# Patient Record
Sex: FEMALE | Race: WHITE | NOT HISPANIC OR LATINO | URBAN - METROPOLITAN AREA
[De-identification: names, ages, dates, MRNs, and addresses within clinical notes are randomized per-mention and may not be internally consistent; named-entity substitution may affect disease eponyms.]

---

## 2019-05-09 ENCOUNTER — APPOINTMENT (RX ONLY)
Dept: URBAN - METROPOLITAN AREA CLINIC 148 | Facility: CLINIC | Age: 59
Setting detail: DERMATOLOGY
End: 2019-05-09

## 2019-05-09 DIAGNOSIS — L81.4 OTHER MELANIN HYPERPIGMENTATION: ICD-10-CM

## 2019-05-09 DIAGNOSIS — D18.0 HEMANGIOMA: ICD-10-CM

## 2019-05-09 DIAGNOSIS — L82.1 OTHER SEBORRHEIC KERATOSIS: ICD-10-CM

## 2019-05-09 DIAGNOSIS — L72.8 OTHER FOLLICULAR CYSTS OF THE SKIN AND SUBCUTANEOUS TISSUE: ICD-10-CM

## 2019-05-09 DIAGNOSIS — D22 MELANOCYTIC NEVI: ICD-10-CM

## 2019-05-09 PROBLEM — D18.01 HEMANGIOMA OF SKIN AND SUBCUTANEOUS TISSUE: Status: ACTIVE | Noted: 2019-05-09

## 2019-05-09 PROBLEM — L20.84 INTRINSIC (ALLERGIC) ECZEMA: Status: ACTIVE | Noted: 2019-05-09

## 2019-05-09 PROBLEM — D22.5 MELANOCYTIC NEVI OF TRUNK: Status: ACTIVE | Noted: 2019-05-09

## 2019-05-09 PROCEDURE — ? COUNSELING

## 2019-05-09 PROCEDURE — ? INVENTORY

## 2019-05-09 PROCEDURE — ? ADDITIONAL NOTES

## 2019-05-09 PROCEDURE — 99203 OFFICE O/P NEW LOW 30 MIN: CPT

## 2019-05-09 PROCEDURE — ? DEFER

## 2019-05-09 ASSESSMENT — LOCATION DETAILED DESCRIPTION DERM
LOCATION DETAILED: INFERIOR THORACIC SPINE
LOCATION DETAILED: MIDDLE STERNUM
LOCATION DETAILED: RIGHT PROXIMAL DORSAL FOREARM
LOCATION DETAILED: RIGHT SUPERIOR UPPER BACK
LOCATION DETAILED: LEFT PROXIMAL DORSAL FOREARM
LOCATION DETAILED: RIGHT MID-UPPER BACK
LOCATION DETAILED: RIGHT ANTERIOR PROXIMAL UPPER ARM
LOCATION DETAILED: EPIGASTRIC SKIN
LOCATION DETAILED: LEFT ANTERIOR DISTAL THIGH
LOCATION DETAILED: LEFT ANTERIOR PROXIMAL UPPER ARM
LOCATION DETAILED: UPPER STERNUM
LOCATION DETAILED: RIGHT ANTERIOR DISTAL THIGH

## 2019-05-09 ASSESSMENT — LOCATION ZONE DERM
LOCATION ZONE: ARM
LOCATION ZONE: LEG
LOCATION ZONE: TRUNK

## 2019-05-09 ASSESSMENT — LOCATION SIMPLE DESCRIPTION DERM
LOCATION SIMPLE: LEFT THIGH
LOCATION SIMPLE: RIGHT THIGH
LOCATION SIMPLE: LEFT UPPER ARM
LOCATION SIMPLE: ABDOMEN
LOCATION SIMPLE: CHEST
LOCATION SIMPLE: RIGHT UPPER ARM
LOCATION SIMPLE: LEFT FOREARM
LOCATION SIMPLE: RIGHT FOREARM
LOCATION SIMPLE: UPPER BACK
LOCATION SIMPLE: RIGHT UPPER BACK

## 2019-05-09 NOTE — PROCEDURE: ADDITIONAL NOTES
Additional Notes: Patient aware of scheduling with Dr. Villar we will call when availability comes up.
Detail Level: Simple

## 2019-05-09 NOTE — PROCEDURE: DEFER
Scheduling Instructions (Optional): Excision with Dr. Villar
Detail Level: Zone
Introduction Text (Please End With A Colon): The following procedure was deferred:

## 2019-05-16 ENCOUNTER — APPOINTMENT (RX ONLY)
Dept: URBAN - METROPOLITAN AREA CLINIC 148 | Facility: CLINIC | Age: 59
Setting detail: DERMATOLOGY
End: 2019-05-16

## 2019-05-16 DIAGNOSIS — L72.8 OTHER FOLLICULAR CYSTS OF THE SKIN AND SUBCUTANEOUS TISSUE: ICD-10-CM

## 2019-05-16 PROCEDURE — ? PRESCRIPTION

## 2019-05-16 PROCEDURE — ? EXCISION

## 2019-05-16 PROCEDURE — 11403 EXC TR-EXT B9+MARG 2.1-3CM: CPT

## 2019-05-16 PROCEDURE — 13101 CMPLX RPR TRUNK 2.6-7.5 CM: CPT

## 2019-05-16 RX ORDER — MUPIROCIN 20 MG/G
OINTMENT TOPICAL
Qty: 1 | Refills: 0 | Status: ERX | COMMUNITY
Start: 2019-05-16

## 2019-05-16 RX ORDER — DOXYCYCLINE HYCLATE 100 MG/1
CAPSULE, GELATIN COATED ORAL
Qty: 14 | Refills: 0 | Status: ERX | COMMUNITY
Start: 2019-05-16

## 2019-05-16 RX ADMIN — MUPIROCIN: 20 OINTMENT TOPICAL at 21:05

## 2019-05-16 RX ADMIN — DOXYCYCLINE HYCLATE: 100 CAPSULE, GELATIN COATED ORAL at 21:05

## 2019-05-16 ASSESSMENT — LOCATION DETAILED DESCRIPTION DERM: LOCATION DETAILED: RIGHT SUPERIOR UPPER BACK

## 2019-05-16 ASSESSMENT — LOCATION ZONE DERM: LOCATION ZONE: TRUNK

## 2019-05-16 ASSESSMENT — LOCATION SIMPLE DESCRIPTION DERM: LOCATION SIMPLE: RIGHT UPPER BACK

## 2019-05-16 NOTE — PROCEDURE: EXCISION
Medical Necessity Clause: This procedure was medically necessary because the lesion that was treated was: irritated and itchy.

## 2019-05-31 ENCOUNTER — APPOINTMENT (RX ONLY)
Dept: URBAN - METROPOLITAN AREA CLINIC 148 | Facility: CLINIC | Age: 59
Setting detail: DERMATOLOGY
End: 2019-05-31

## 2019-05-31 DIAGNOSIS — Z48.02 ENCOUNTER FOR REMOVAL OF SUTURES: ICD-10-CM

## 2019-05-31 PROCEDURE — ? SUTURE REMOVAL (GLOBAL PERIOD)

## 2019-05-31 PROCEDURE — 99024 POSTOP FOLLOW-UP VISIT: CPT

## 2019-05-31 ASSESSMENT — LOCATION DETAILED DESCRIPTION DERM: LOCATION DETAILED: RIGHT SUPERIOR UPPER BACK

## 2019-05-31 ASSESSMENT — LOCATION ZONE DERM: LOCATION ZONE: TRUNK

## 2019-05-31 ASSESSMENT — LOCATION SIMPLE DESCRIPTION DERM: LOCATION SIMPLE: RIGHT UPPER BACK

## 2019-05-31 NOTE — PROCEDURE: SUTURE REMOVAL (GLOBAL PERIOD)
Body Location Override (Optional - Billing Will Still Be Based On Selected Body Map Location If Applicable): right sup upper back
Add 77480 Cpt? (Important Note: In 2017 The Use Of 52430 Is Being Tracked By Cms To Determine Future Global Period Reimbursement For Global Periods): yes
Detail Level: Detailed

## 2020-08-25 NOTE — PROCEDURE: EXCISION
WHEN RN GOES TO DISCHARGE PT, PT REPORTS WONDERING IF SHE COULD HAVE A KIDNEY INFECTION. RN DIPS URINE AND SHOWS DR GARCIA RESULT.  ORDERS PT TO HAVE URINE SPECIMEN FOR CULTURE SENT. PT TO ASK  ON THIS Friday APPT FOR RESULT OF URINE. PT ALSO GIVEN DISCHARGE INSTRUCTIONS AND VERBALIZES UNDERSTANDING. no Epidermal Closure Graft Donor Site (Optional): simple interrupted

## 2021-01-07 NOTE — PROCEDURE: EXCISION
Routing refill request to provider for review/approval because:  Medication is reported/historical    Ivelisse Epperson RN           Cheek-To-Nose Interpolation Flap Text: A decision was made to reconstruct the defect utilizing an interpolation axial flap and a staged reconstruction.  A telfa template was made of the defect.  This telfa template was then used to outline the Cheek-To-Nose Interpolation flap.  The donor area for the pedicle flap was then injected with anesthesia.  The flap was excised through the skin and subcutaneous tissue down to the layer of the underlying musculature.  The interpolation flap was carefully excised within this deep plane to maintain its blood supply.  The edges of the donor site were undermined.   The donor site was closed in a primary fashion.  The pedicle was then rotated into position and sutured.  Once the tube was sutured into place, adequate blood supply was confirmed with blanching and refill.  The pedicle was then wrapped with xeroform gauze and dressed appropriately with a telfa and gauze bandage to ensure continued blood supply and protect the attached pedicle.

## 2022-07-09 ENCOUNTER — TELEPHONE ENCOUNTER (OUTPATIENT)
Dept: URBAN - METROPOLITAN AREA CLINIC 121 | Facility: CLINIC | Age: 62
End: 2022-07-09

## 2022-07-09 RX ORDER — CIPROFLOXACIN HCL 500 MG
TABLET ORAL ONCE A DAY
Refills: 3 | OUTPATIENT
Start: 2005-01-31 | End: 2005-09-12

## 2022-07-09 RX ORDER — PANTOPRAZOLE SODIUM 40 MG/1
TABLET, DELAYED RELEASE ORAL ONCE A DAY
Refills: 0 | OUTPATIENT
Start: 2014-04-28 | End: 2014-10-22

## 2022-07-09 RX ORDER — DEXTROSE 4 G
TABLET,CHEWABLE ORAL AS NEEDED
Refills: 0 | OUTPATIENT
Start: 2017-06-01 | End: 2017-07-20

## 2022-07-09 RX ORDER — PANTOPRAZOLE SODIUM 40 MG/1
TABLET, DELAYED RELEASE ORAL ONCE A DAY
Refills: 0 | OUTPATIENT
Start: 2014-10-22 | End: 2015-05-12

## 2022-07-09 RX ORDER — SPIRONOLACTONE 100 MG/1
TABLET, FILM COATED ORAL TWICE A DAY
Refills: 0 | OUTPATIENT
Start: 2016-12-13 | End: 2017-06-01

## 2022-07-09 RX ORDER — TRAMADOL HYDROCHLORIDE 50 MG/1
TABLET ORAL AS NEEDED
Refills: 0 | OUTPATIENT
Start: 2014-10-22 | End: 2015-05-12

## 2022-07-09 RX ORDER — MACITENTAN 10 MG/1
TABLET, FILM COATED ORAL ONCE A DAY
Refills: 0 | OUTPATIENT
Start: 2016-12-13 | End: 2017-06-01

## 2022-07-09 RX ORDER — ASPIRIN 81 MG/1
TABLET, COATED ORAL ONCE A DAY
Refills: 0 | OUTPATIENT
Start: 2016-12-13 | End: 2017-06-01

## 2022-07-09 RX ORDER — FUROSEMIDE 40 MG/1
TABLET ORAL TWICE A DAY
Refills: 0 | OUTPATIENT
Start: 2016-01-26 | End: 2016-12-13

## 2022-07-09 RX ORDER — MACITENTAN 10 MG/1
TABLET, FILM COATED ORAL ONCE A DAY
Refills: 0 | OUTPATIENT
Start: 2017-06-01 | End: 2017-07-20

## 2022-07-09 RX ORDER — ALBUTEROL SULFATE 90 UG/1
AEROSOL, METERED RESPIRATORY (INHALATION) ONCE A DAY
Refills: 0 | OUTPATIENT
Start: 2016-01-26 | End: 2016-01-26

## 2022-07-09 RX ORDER — ASPIRIN 81 MG/1
TABLET, DELAYED RELEASE ORAL
Refills: 0 | OUTPATIENT
Start: 2013-10-11 | End: 2014-04-28

## 2022-07-09 RX ORDER — INSULIN GLARGINE 100 [IU]/ML
18 UNITS QH INJECTION, SOLUTION SUBCUTANEOUS
Refills: 0 | OUTPATIENT
Start: 2017-06-01 | End: 2017-07-20

## 2022-07-09 RX ORDER — SILDENAFIL CITRATE 20 MG/1
TABLET, FILM COATED ORAL
Refills: 0 | OUTPATIENT
Start: 2014-04-28 | End: 2014-10-22

## 2022-07-09 RX ORDER — INSULIN GLARGINE 100 [IU]/ML
INJECTION, SOLUTION SUBCUTANEOUS
Refills: 0 | OUTPATIENT
Start: 2014-12-19 | End: 2015-09-08

## 2022-07-09 RX ORDER — TREPROSTINIL 0.12 MG/1
TABLET, EXTENDED RELEASE ORAL THREE TIMES A DAY
Refills: 0 | OUTPATIENT
Start: 2016-01-20 | End: 2016-01-26

## 2022-07-09 RX ORDER — INSULIN GLARGINE 100 [IU]/ML
18 UNITS QH INJECTION, SOLUTION SUBCUTANEOUS
Refills: 0 | OUTPATIENT
Start: 2016-01-26 | End: 2016-12-13

## 2022-07-09 RX ORDER — FUROSEMIDE 40 MG/1
TABLET ORAL TWICE A DAY
Refills: 0 | OUTPATIENT
Start: 2017-06-01 | End: 2017-07-20

## 2022-07-09 RX ORDER — FUROSEMIDE 40 MG/1
TABLET ORAL
Refills: 0 | OUTPATIENT
Start: 2014-04-28 | End: 2014-10-22

## 2022-07-09 RX ORDER — TRAMADOL HYDROCHLORIDE 50 MG/1
TABLET ORAL
Refills: 0 | OUTPATIENT
Start: 2014-04-28 | End: 2014-10-22

## 2022-07-09 RX ORDER — TRAMADOL HYDROCHLORIDE 50 MG/1
TABLET ORAL
Refills: 0 | OUTPATIENT
Start: 2013-10-11 | End: 2014-04-28

## 2022-07-09 RX ORDER — SPIRONOLACTONE 100 MG/1
TABLET, FILM COATED ORAL TWICE A DAY
Refills: 0 | OUTPATIENT
Start: 2017-06-01 | End: 2017-07-20

## 2022-07-09 RX ORDER — FUROSEMIDE 40 MG/1
TABLET ORAL TWICE A DAY
Refills: 0 | OUTPATIENT
Start: 2014-10-22 | End: 2015-09-08

## 2022-07-09 RX ORDER — FILGRASTIM 300 UG/ML
INJECT SQ ONCE/WEEK INJECTION, SOLUTION INTRAVENOUS; SUBCUTANEOUS AS DIRECTED
Refills: 3 | OUTPATIENT
Start: 2005-11-28 | End: 2006-05-31

## 2022-07-09 RX ORDER — ASPIRIN 81 MG/1
TABLET, COATED ORAL ONCE A DAY
Refills: 0 | OUTPATIENT
Start: 2017-06-01 | End: 2017-07-20

## 2022-07-09 RX ORDER — SPIRONOLACTONE 100 MG/1
TABLET, FILM COATED ORAL
Refills: 0 | OUTPATIENT
Start: 2014-04-28 | End: 2014-10-22

## 2022-07-09 RX ORDER — GLYBURIDE 5 MG/1
TABLET ORAL TWICE A DAY
Refills: 0 | OUTPATIENT
Start: 2015-09-08 | End: 2016-01-26

## 2022-07-09 RX ORDER — DEXTROSE 4 G
TABLET,CHEWABLE ORAL AS NEEDED
Refills: 0 | OUTPATIENT
Start: 2016-12-13 | End: 2017-06-01

## 2022-07-09 RX ORDER — MACITENTAN 10 MG/1
TABLET, FILM COATED ORAL
Refills: 0 | OUTPATIENT
Start: 2015-07-14 | End: 2015-09-08

## 2022-07-09 RX ORDER — PREDNISONE 1 MG/1
TABLET ORAL
Refills: 0 | OUTPATIENT
Start: 2015-07-14 | End: 2015-09-08

## 2022-07-09 RX ORDER — ASPIRIN 81 MG/1
TABLET, DELAYED RELEASE ORAL
Refills: 0 | OUTPATIENT
Start: 2014-04-28 | End: 2014-10-22

## 2022-07-09 RX ORDER — ECHINACEA PURPUREA AERIAL 350 MG
CAPSULE ORAL
Refills: 0 | OUTPATIENT
Start: 2014-10-22 | End: 2015-05-12

## 2022-07-09 RX ORDER — FUROSEMIDE 40 MG/1
TABLET ORAL
Refills: 0 | OUTPATIENT
Start: 2013-10-11 | End: 2014-04-28

## 2022-07-09 RX ORDER — DEXTROSE 4 G
TABLET,CHEWABLE ORAL AS NEEDED
Refills: 0 | OUTPATIENT
Start: 2016-01-26 | End: 2016-12-13

## 2022-07-09 RX ORDER — PREDNISONE 5 MG/1
TABLET ORAL
Refills: 0 | OUTPATIENT
Start: 2015-07-14 | End: 2015-07-14

## 2022-07-09 RX ORDER — ALBUTEROL SULFATE 90 UG/1
AEROSOL, METERED RESPIRATORY (INHALATION) AS NEEDED
Refills: 0 | OUTPATIENT
Start: 2014-10-22 | End: 2015-09-08

## 2022-07-09 RX ORDER — GLYBURIDE 5 MG/1
TABLET ORAL
Refills: 0 | OUTPATIENT
Start: 2014-04-28 | End: 2014-10-22

## 2022-07-09 RX ORDER — SPIRONOLACTONE 100 MG/1
TABLET, FILM COATED ORAL ONCE A DAY
Refills: 0 | OUTPATIENT
Start: 2014-10-22 | End: 2015-06-02

## 2022-07-09 RX ORDER — GLYBURIDE 5 MG/1
TABLET ORAL TWICE A DAY
Refills: 0 | OUTPATIENT
Start: 2014-10-22 | End: 2015-09-08

## 2022-07-09 RX ORDER — ALBUTEROL SULFATE 90 UG/1
AEROSOL, METERED RESPIRATORY (INHALATION)
Refills: 0 | OUTPATIENT
Start: 2013-10-11 | End: 2014-04-28

## 2022-07-09 RX ORDER — SPIRONOLACTONE 100 MG/1
TWICE A DAY TABLET, FILM COATED ORAL TWICE A DAY
Refills: 1 | OUTPATIENT
Start: 2015-05-12 | End: 2015-08-04

## 2022-07-09 RX ORDER — FUROSEMIDE 40 MG/1
TABLET ORAL ONCE A DAY
Refills: 0 | OUTPATIENT
Start: 2015-09-08 | End: 2016-01-26

## 2022-07-09 RX ORDER — FILGRASTIM 300 UG/ML
INJECT SQ ONCE/WEEK INJECTION, SOLUTION INTRAVENOUS; SUBCUTANEOUS AS DIRECTED
Refills: 3 | OUTPATIENT
Start: 2005-08-26 | End: 2005-11-28

## 2022-07-09 RX ORDER — MACITENTAN 10 MG/1
TABLET, FILM COATED ORAL ONCE A DAY
Refills: 0 | OUTPATIENT
Start: 2015-09-08 | End: 2016-01-26

## 2022-07-09 RX ORDER — SPIRONOLACTONE 50 MG/1
TABLET, FILM COATED ORAL ONCE A DAY
Refills: 0 | OUTPATIENT
Start: 2005-08-05 | End: 2005-09-12

## 2022-07-09 RX ORDER — SPIRONOLACTONE 50 MG/1
TABLET, FILM COATED ORAL ONCE A DAY
Refills: 3 | OUTPATIENT
Start: 2005-09-12 | End: 2005-12-19

## 2022-07-09 RX ORDER — ALBUTEROL SULFATE 90 UG/1
AEROSOL, METERED RESPIRATORY (INHALATION) AS NEEDED
Refills: 0 | OUTPATIENT
Start: 2015-09-08 | End: 2016-01-26

## 2022-07-09 RX ORDER — POLYETHYLENE GLYCOL 3350, SODIUM SULFATE, SODIUM CHLORIDE, POTASSIUM CHLORIDE, ASCORBIC ACID, SODIUM ASCORBATE 7.5-2.691G
KIT ORAL TAKE AS DIRECTED
Refills: 0 | OUTPATIENT
Start: 2014-10-22 | End: 2014-12-23

## 2022-07-09 RX ORDER — INSULIN GLARGINE 100 [IU]/ML
18 UNITS QH INJECTION, SOLUTION SUBCUTANEOUS
Refills: 0 | OUTPATIENT
Start: 2016-12-13 | End: 2017-06-01

## 2022-07-09 RX ORDER — SPIRONOLACTONE 50 MG/1
TABLET, FILM COATED ORAL ONCE A DAY
Refills: 3 | OUTPATIENT
Start: 2005-12-19 | End: 2006-04-03

## 2022-07-09 RX ORDER — DEXTROSE 4 G
TABLET,CHEWABLE ORAL AS NEEDED
Refills: 0 | OUTPATIENT
Start: 2015-08-20 | End: 2016-01-26

## 2022-07-09 RX ORDER — INSULIN GLARGINE 100 [IU]/ML
18 UNITS QH INJECTION, SOLUTION SUBCUTANEOUS
Refills: 0 | OUTPATIENT
Start: 2017-07-20 | End: 2017-07-20

## 2022-07-09 RX ORDER — AZITHROMYCIN 250 MG/1
TABLET, FILM COATED ORAL ONCE A DAY
Refills: 0 | OUTPATIENT
Start: 2016-01-22 | End: 2016-01-26

## 2022-07-09 RX ORDER — GABAPENTIN 300 MG/1
CAPSULE ORAL THREE TIMES A DAY
Refills: 0 | OUTPATIENT
Start: 2017-06-01 | End: 2017-07-20

## 2022-07-09 RX ORDER — SPIRONOLACTONE 100 MG/1
TABLET, FILM COATED ORAL
Refills: 0 | OUTPATIENT
Start: 2013-10-11 | End: 2014-04-28

## 2022-07-09 RX ORDER — PANTOPRAZOLE SODIUM 40 MG/1
TABLET, DELAYED RELEASE ORAL ONCE A DAY
Refills: 0 | OUTPATIENT
Start: 2015-09-06 | End: 2016-01-26

## 2022-07-09 RX ORDER — ALBUTEROL SULFATE 90 UG/1
AEROSOL, METERED RESPIRATORY (INHALATION)
Refills: 0 | OUTPATIENT
Start: 2014-04-28 | End: 2014-10-22

## 2022-07-09 RX ORDER — TERBINAFINE 250 MG/1
TABLET ORAL ONCE A DAY
Refills: 0 | OUTPATIENT
Start: 2017-06-01 | End: 2017-07-20

## 2022-07-09 RX ORDER — SPIRONOLACTONE 50 MG/1
TABLET, FILM COATED ORAL ONCE A DAY
Refills: 3 | OUTPATIENT
Start: 2006-04-03 | End: 2006-08-04

## 2022-07-09 RX ORDER — ALBUTEROL SULFATE 90 UG/1
AEROSOL, METERED RESPIRATORY (INHALATION) ONCE A DAY
Refills: 0 | OUTPATIENT
Start: 2016-01-26 | End: 2016-12-13

## 2022-07-09 RX ORDER — SPIRONOLACTONE 50 MG/1
TABLET, FILM COATED ORAL ONCE A DAY
Refills: 3 | OUTPATIENT
Start: 2005-03-31 | End: 2005-08-05

## 2022-07-09 RX ORDER — SPIRONOLACTONE 50 MG/1
TABLET, FILM COATED ORAL ONCE A DAY
Refills: 3 | OUTPATIENT
Start: 2006-08-04 | End: 2007-01-08

## 2022-07-09 RX ORDER — PREDNISONE 1 MG/1
TABLET ORAL AS NEEDED
Refills: 0 | OUTPATIENT
Start: 2015-09-08 | End: 2016-01-26

## 2022-07-09 RX ORDER — SPIRONOLACTONE 100 MG/1
TABLET, FILM COATED ORAL TWICE A DAY
Refills: 0 | OUTPATIENT
Start: 2016-01-26 | End: 2016-12-13

## 2022-07-09 RX ORDER — ASPIRIN 81 MG/1
TABLET, COATED ORAL ONCE A DAY
Refills: 0 | OUTPATIENT
Start: 2016-01-26 | End: 2016-12-13

## 2022-07-09 RX ORDER — ALBUTEROL SULFATE 90 UG/1
AEROSOL, METERED RESPIRATORY (INHALATION) ONCE A DAY
Refills: 0 | OUTPATIENT
Start: 2017-06-01 | End: 2017-07-20

## 2022-07-09 RX ORDER — CIPROFLOXACIN HCL 500 MG
TABLET ORAL ONCE A DAY
Refills: 3 | OUTPATIENT
Start: 2005-09-12 | End: 2006-09-06

## 2022-07-09 RX ORDER — MACITENTAN 10 MG/1
TABLET, FILM COATED ORAL ONCE A DAY
Refills: 0 | OUTPATIENT
Start: 2016-01-26 | End: 2016-12-13

## 2022-07-09 RX ORDER — ASPIRIN 81 MG/1
TABLET, DELAYED RELEASE ORAL ONCE A DAY
Refills: 0 | OUTPATIENT
Start: 2014-10-22 | End: 2015-05-12

## 2022-07-09 RX ORDER — GABAPENTIN 300 MG/1
CAPSULE ORAL THREE TIMES A DAY
Refills: 0 | OUTPATIENT
Start: 2016-12-13 | End: 2017-06-01

## 2022-07-09 RX ORDER — GLYBURIDE 5 MG/1
TABLET ORAL
Refills: 0 | OUTPATIENT
Start: 2013-10-11 | End: 2014-04-28

## 2022-07-09 RX ORDER — SPIRONOLACTONE 100 MG/1
TWICE A DAY TABLET, FILM COATED ORAL TWICE A DAY
Refills: 5 | OUTPATIENT
Start: 2015-08-04 | End: 2016-01-26

## 2022-07-09 RX ORDER — FUROSEMIDE 40 MG/1
TABLET ORAL TWICE A DAY
Refills: 0 | OUTPATIENT
Start: 2016-12-13 | End: 2017-06-01

## 2022-07-09 RX ORDER — ALBUTEROL SULFATE 90 UG/1
AEROSOL, METERED RESPIRATORY (INHALATION) ONCE A DAY
Refills: 0 | OUTPATIENT
Start: 2016-12-13 | End: 2017-06-01

## 2022-07-09 RX ORDER — SILDENAFIL CITRATE 20 MG/1
TABLET, FILM COATED ORAL
Refills: 0 | OUTPATIENT
Start: 2013-10-11 | End: 2014-04-28

## 2022-07-10 ENCOUNTER — TELEPHONE ENCOUNTER (OUTPATIENT)
Dept: URBAN - METROPOLITAN AREA CLINIC 121 | Facility: CLINIC | Age: 62
End: 2022-07-10

## 2022-07-10 RX ORDER — DEXTROSE 4 G
TABLET,CHEWABLE ORAL AS NEEDED
Refills: 0 | Status: ACTIVE | COMMUNITY
Start: 2017-07-20

## 2022-07-10 RX ORDER — SPIRONOLACTONE 50 MG/1
TABLET, FILM COATED ORAL ONCE A DAY
Refills: 3 | Status: ACTIVE | COMMUNITY
Start: 2007-01-08

## 2022-07-10 RX ORDER — FUROSEMIDE 20 MG/1
TABLET ORAL ONCE A DAY
Refills: 3 | Status: ACTIVE | COMMUNITY
Start: 2005-02-14

## 2022-07-10 RX ORDER — PANTOPRAZOLE SODIUM 40 MG/1
TABLET, DELAYED RELEASE ORAL ONCE A DAY
Refills: 0 | Status: ACTIVE | COMMUNITY
Start: 2016-01-26

## 2022-07-10 RX ORDER — ASPIRIN 81 MG/1
TABLET, COATED ORAL ONCE A DAY
Refills: 0 | Status: ACTIVE | COMMUNITY
Start: 2017-07-20

## 2022-07-10 RX ORDER — TREPROSTINIL 0.12 MG/1
TABLET, EXTENDED RELEASE ORAL THREE TIMES A DAY
Refills: 0 | Status: ACTIVE | COMMUNITY
Start: 2016-01-26

## 2022-07-10 RX ORDER — INSULIN GLARGINE 100 [IU]/ML
18 UNITS QH INJECTION, SOLUTION SUBCUTANEOUS
Refills: 0 | Status: ACTIVE | COMMUNITY
Start: 2017-07-20

## 2022-07-10 RX ORDER — FUROSEMIDE 40 MG/1
TABLET ORAL TWICE A DAY
Refills: 0 | Status: ACTIVE | COMMUNITY
Start: 2017-07-20

## 2022-07-10 RX ORDER — SPIRONOLACTONE 100 MG/1
TABLET, FILM COATED ORAL TWICE A DAY
Refills: 0 | Status: ACTIVE | COMMUNITY
Start: 2017-07-20

## 2022-07-10 RX ORDER — BLOOD SUGAR DIAGNOSTIC
STRIP MISCELLANEOUS AS DIRECTED
Refills: 0 | Status: ACTIVE | COMMUNITY
Start: 2005-09-15

## 2022-07-10 RX ORDER — MACITENTAN 10 MG/1
TABLET, FILM COATED ORAL ONCE A DAY
Refills: 0 | Status: ACTIVE | COMMUNITY
Start: 2017-07-20

## 2022-07-10 RX ORDER — INSULIN GLARGINE 100 [IU]/ML
12 UNITS QH INJECTION, SOLUTION SUBCUTANEOUS
Refills: 0 | Status: ACTIVE | COMMUNITY
Start: 2015-09-08

## 2022-07-10 RX ORDER — TERBINAFINE 250 MG/1
TABLET ORAL ONCE A DAY
Refills: 0 | Status: ACTIVE | COMMUNITY
Start: 2017-07-20

## 2022-07-10 RX ORDER — FILGRASTIM 300 UG/ML
INJECT 1CC SQ ONCE/WEEK INJECTION, SOLUTION INTRAVENOUS; SUBCUTANEOUS AS DIRECTED
Refills: 3 | Status: ACTIVE | COMMUNITY
Start: 2006-05-31

## 2022-07-10 RX ORDER — CIPROFLOXACIN HCL 500 MG
TABLET ORAL ONCE A DAY
Refills: 3 | Status: ACTIVE | COMMUNITY
Start: 2006-09-06

## 2022-07-10 RX ORDER — GABAPENTIN 300 MG/1
CAPSULE ORAL THREE TIMES A DAY
Refills: 0 | Status: ACTIVE | COMMUNITY
Start: 2017-07-20

## 2022-07-10 RX ORDER — ALBUTEROL SULFATE 90 UG/1
AEROSOL, METERED RESPIRATORY (INHALATION) ONCE A DAY
Refills: 0 | Status: ACTIVE | COMMUNITY
Start: 2017-07-20

## 2022-07-10 RX ORDER — AZITHROMYCIN 250 MG/1
TABLET, FILM COATED ORAL ONCE A DAY
Refills: 0 | Status: ACTIVE | COMMUNITY
Start: 2016-01-26

## 2022-07-30 ENCOUNTER — TELEPHONE ENCOUNTER (OUTPATIENT)
Age: 62
End: 2022-07-30

## 2022-07-31 ENCOUNTER — TELEPHONE ENCOUNTER (OUTPATIENT)
Age: 62
End: 2022-07-31

## 2023-03-23 ENCOUNTER — TRANSCRIBE ORDERS (OUTPATIENT)
Dept: ADMINISTRATIVE | Facility: HOSPITAL | Age: 63
End: 2023-03-23
Payer: COMMERCIAL

## 2023-03-23 DIAGNOSIS — Z12.31 ENCOUNTER FOR SCREENING MAMMOGRAM FOR MALIGNANT NEOPLASM OF BREAST: Primary | ICD-10-CM

## 2023-03-23 DIAGNOSIS — N95.9 MENOPAUSAL AND POSTMENOPAUSAL DISORDER: Primary | ICD-10-CM

## 2023-04-18 ENCOUNTER — HOSPITAL ENCOUNTER (OUTPATIENT)
Dept: MAMMOGRAPHY | Facility: HOSPITAL | Age: 63
Discharge: HOME | End: 2023-04-18
Payer: COMMERCIAL

## 2023-04-18 ENCOUNTER — HOSPITAL ENCOUNTER (OUTPATIENT)
Dept: BONE DENSITY | Facility: HOSPITAL | Age: 63
Discharge: HOME | End: 2023-04-18
Payer: COMMERCIAL

## 2023-04-18 DIAGNOSIS — N95.9 MENOPAUSAL AND POSTMENOPAUSAL DISORDER: ICD-10-CM

## 2023-04-18 DIAGNOSIS — Z12.31 ENCOUNTER FOR SCREENING MAMMOGRAM FOR MALIGNANT NEOPLASM OF BREAST: ICD-10-CM

## 2023-04-18 PROCEDURE — 77067 SCR MAMMO BI INCL CAD: CPT

## 2023-04-18 PROCEDURE — 77067 SCR MAMMO BI INCL CAD: CPT | Performed by: RADIOLOGY

## 2023-04-18 PROCEDURE — 77080 DXA BONE DENSITY AXIAL: CPT | Performed by: RADIOLOGY

## 2023-04-18 PROCEDURE — 77063 BREAST TOMOSYNTHESIS BI: CPT

## 2023-04-18 PROCEDURE — 77063 BREAST TOMOSYNTHESIS BI: CPT | Performed by: RADIOLOGY

## 2023-04-18 PROCEDURE — 77080 DXA BONE DENSITY AXIAL: CPT

## 2023-10-05 ENCOUNTER — TRANSCRIBE ORDERS (OUTPATIENT)
Dept: ADMINISTRATIVE | Facility: HOSPITAL | Age: 63
End: 2023-10-05
Payer: COMMERCIAL

## 2023-10-05 DIAGNOSIS — F17.210 CIGARETTE SMOKER: Primary | ICD-10-CM

## 2023-10-31 ENCOUNTER — HOSPITAL ENCOUNTER (OUTPATIENT)
Dept: CT IMAGING | Facility: HOSPITAL | Age: 63
Discharge: HOME | End: 2023-10-31
Admitting: PHYSICIAN ASSISTANT
Payer: MEDICARE

## 2023-10-31 DIAGNOSIS — F17.210 CIGARETTE SMOKER: ICD-10-CM

## 2023-10-31 PROCEDURE — 71271 CT THORAX LUNG CANCER SCR C-: CPT | Performed by: RADIOLOGY

## 2023-10-31 PROCEDURE — 71271 CT THORAX LUNG CANCER SCR C-: CPT
